# Patient Record
Sex: MALE | Race: WHITE | NOT HISPANIC OR LATINO | Employment: OTHER | URBAN - METROPOLITAN AREA
[De-identification: names, ages, dates, MRNs, and addresses within clinical notes are randomized per-mention and may not be internally consistent; named-entity substitution may affect disease eponyms.]

---

## 2019-04-29 RX ORDER — DICLOFENAC SODIUM AND MISOPROSTOL 75; 200 MG/1; UG/1
TABLET, DELAYED RELEASE ORAL
Qty: 60 TABLET | Refills: 5 | Status: SHIPPED | OUTPATIENT
Start: 2019-04-29

## 2020-10-21 ENCOUNTER — OFFICE VISIT (OUTPATIENT)
Dept: CARDIOTHORACIC SURGERY | Facility: CLINIC | Age: 80
End: 2020-10-21
Payer: MEDICARE

## 2020-10-21 VITALS
HEART RATE: 40 BPM | BODY MASS INDEX: 27.55 KG/M2 | DIASTOLIC BLOOD PRESSURE: 73 MMHG | HEIGHT: 69 IN | SYSTOLIC BLOOD PRESSURE: 132 MMHG | WEIGHT: 186 LBS

## 2020-10-21 DIAGNOSIS — D64.9 ANEMIA, UNSPECIFIED TYPE: ICD-10-CM

## 2020-10-21 DIAGNOSIS — I48.20 CHRONIC ATRIAL FIBRILLATION: ICD-10-CM

## 2020-10-21 DIAGNOSIS — I34.0 MITRAL VALVE INSUFFICIENCY, UNSPECIFIED ETIOLOGY: ICD-10-CM

## 2020-10-21 PROCEDURE — 99213 OFFICE O/P EST LOW 20 MIN: CPT | Mod: S$GLB,,, | Performed by: NURSE PRACTITIONER

## 2020-10-21 PROCEDURE — 99213 PR OFFICE/OUTPT VISIT, EST, LEVL III, 20-29 MIN: ICD-10-PCS | Mod: S$GLB,,, | Performed by: NURSE PRACTITIONER

## 2020-10-21 RX ORDER — CLORAZEPATE DIPOTASSIUM 7.5 MG/1
TABLET ORAL
COMMUNITY

## 2020-10-21 RX ORDER — BRIMONIDINE TARTRATE 1 MG/ML
SOLUTION/ DROPS OPHTHALMIC
COMMUNITY

## 2020-10-21 RX ORDER — LISINOPRIL 10 MG/1
TABLET ORAL
COMMUNITY

## 2020-10-21 RX ORDER — HYDROXYZINE HYDROCHLORIDE 25 MG/1
TABLET, FILM COATED ORAL
COMMUNITY

## 2020-10-21 RX ORDER — DORZOLAMIDE HYDROCHLORIDE AND TIMOLOL MALEATE 20; 5 MG/ML; MG/ML
SOLUTION/ DROPS OPHTHALMIC
COMMUNITY

## 2020-10-21 RX ORDER — TRIAMTERENE AND HYDROCHLOROTHIAZIDE 75; 50 MG/1; MG/1
TABLET ORAL
COMMUNITY

## 2020-10-21 RX ORDER — DABIGATRAN ETEXILATE 150 MG/1
CAPSULE ORAL
COMMUNITY
End: 2020-10-21 | Stop reason: ALTCHOICE

## 2020-10-21 RX ORDER — AMLODIPINE BESYLATE 10 MG/1
TABLET ORAL
COMMUNITY

## 2020-10-21 RX ORDER — ALLOPURINOL 100 MG/1
TABLET ORAL
COMMUNITY

## 2020-10-21 RX ORDER — SERTRALINE HYDROCHLORIDE 100 MG/1
TABLET, FILM COATED ORAL
COMMUNITY

## 2020-10-21 RX ORDER — BIMATOPROST 0.1 MG/ML
SOLUTION/ DROPS OPHTHALMIC
COMMUNITY

## 2020-10-21 NOTE — PROGRESS NOTES
Subjective:    Patient ID:  Jame Herrera is a 80 y.o. male who presents for evaluation of severe prolapse of the P1 segment of the mitral valve; his right ventricle does appear moderately dilated, there is moderate mitral regurgitation identified by DARREN; coronary angiogram revealed normal coronary arteries; patient referred to be evaluated by Dr Apple in the clinic for possible mitral valve replacement and appendage removal.        Review of Systems   Constitution: Negative for chills, decreased appetite, diaphoresis, fever, malaise/fatigue, night sweats, weight gain and weight loss.   Cardiovascular: Negative for chest pain, claudication, dyspnea on exertion, irregular heartbeat, leg swelling, near-syncope, orthopnea, palpitations, paroxysmal nocturnal dyspnea and syncope.   Respiratory: Negative for cough, shortness of breath and sleep disturbances due to breathing.    Musculoskeletal: Positive for joint pain.        To right hip; he states this has been present since his hip surgery.   Gastrointestinal: Negative for anorexia, constipation, diarrhea, nausea and vomiting.   Neurological: Negative for dizziness, light-headedness and weakness.        Objective:    Physical Exam   Constitutional: He is oriented to person, place, and time. He appears well-developed and well-nourished.   Cardiovascular: Normal rate and regular rhythm.   Murmur heard.  Holosystolic murmur   Pulmonary/Chest: Breath sounds normal.   Abdominal: Soft.   Musculoskeletal: Normal range of motion.   Neurological: He is alert and oriented to person, place, and time.   Skin: Skin is warm and dry.         Assessment:       1. Mitral valve insufficiency, unspecified etiology    2. Chronic atrial fibrillation    3. Anemia, unspecified type         Plan:       Jame was seen today for valvular heart disease.    Diagnoses and all orders for this visit:    Mitral valve insufficiency, unspecified etiology    Chronic atrial fibrillation    Anemia,  unspecified type    Patient does ambulate with a cane; states he has been in atrial fib for the past 10 years, but has been told for the past 2 years he has been in NSR; currently on eliquis; will speak to dr perez in regards to patient being placed on vitamin B,C, and epogen prior to surgery; also will need ct of chest and abdomen due to anemia and kidney cancer prior to surgery; if he has not had an event recorder in the past 6 months, he will need one for 7 days to establish his atrial fib burden. Patient to fu once all of the above has been obtained.

## 2020-10-21 NOTE — LETTER
October 21, 2020      Zechariah Cantu IV, MD  [address] [place] [address]           [facility]  [address] [place] [address]  Phone: [phone]  Fax: [phone]          Patient: Jame Herrera   MR Number: [id number]   YOB: 1940   Date of Visit: 10/21/2020       Dear Dr. Zechariah Cantu IV:    Thank you for referring Jame Herrera to me for evaluation. Attached you will find relevant portions of my assessment and plan of care.    If you have questions, please do not hesitate to call me. I look forward to following Jame Herrera along with you.    Sincerely,    Nancy Presley, NP    Enclosure  CC:  No Recipients    If you would like to receive this communication electronically, please contact [email or web address] or [phone] to request more information on [employer] access.    For providers and/or their staff who would like to refer a patient to [facility], please contact us through our one-stop-shop provider referral line, [facility], at [phone].    If you feel you have received this communication in error or would no longer like to receive these types of communications, please e-mail [email or web address]

## 2020-11-18 ENCOUNTER — OFFICE VISIT (OUTPATIENT)
Dept: CARDIOTHORACIC SURGERY | Facility: CLINIC | Age: 80
End: 2020-11-18
Payer: MEDICARE

## 2020-11-18 VITALS
BODY MASS INDEX: 28.58 KG/M2 | SYSTOLIC BLOOD PRESSURE: 170 MMHG | DIASTOLIC BLOOD PRESSURE: 80 MMHG | OXYGEN SATURATION: 96 % | WEIGHT: 193 LBS | HEIGHT: 69 IN | RESPIRATION RATE: 17 BRPM | HEART RATE: 69 BPM

## 2020-11-18 DIAGNOSIS — I34.0 MITRAL VALVE INSUFFICIENCY, UNSPECIFIED ETIOLOGY: Primary | ICD-10-CM

## 2020-11-18 PROCEDURE — 99213 PR OFFICE/OUTPT VISIT, EST, LEVL III, 20-29 MIN: ICD-10-PCS | Mod: S$GLB,,, | Performed by: NURSE PRACTITIONER

## 2020-11-18 PROCEDURE — 99213 OFFICE O/P EST LOW 20 MIN: CPT | Mod: S$GLB,,, | Performed by: NURSE PRACTITIONER

## 2020-11-18 RX ORDER — FUROSEMIDE 20 MG/1
20 TABLET ORAL 2 TIMES DAILY
COMMUNITY

## 2020-11-18 RX ORDER — ERYTHROMYCIN 5 MG/G
OINTMENT OPHTHALMIC
COMMUNITY
Start: 2020-11-10

## 2020-11-19 NOTE — PROGRESS NOTES
Subjective:    Patient ID:  Jame Herrera is a 80 y.o. male who presents for evaluation of review of preoperative work up that was needed prior to proceeding with mitral valve replacement, maze, and appendage removal; wife and patient are here to discuss recommendations.        Review of Systems   Constitution: Negative for chills, decreased appetite, diaphoresis, fever, malaise/fatigue, night sweats, weight gain and weight loss.   Cardiovascular: Positive for dyspnea on exertion and orthopnea. Negative for chest pain, claudication, irregular heartbeat, leg swelling, near-syncope, palpitations, paroxysmal nocturnal dyspnea and syncope.   Respiratory: Positive for shortness of breath. Negative for cough and sleep disturbances due to breathing.    Gastrointestinal: Negative for anorexia, constipation, diarrhea, nausea and vomiting.   Neurological: Negative for dizziness, light-headedness and weakness.        Objective:    Physical Exam   Constitutional: He is oriented to person, place, and time. He appears well-developed and well-nourished.   Eyes: Left eye exhibits discharge.   From left eye; he states he is being treated for an eye infection at this time   Cardiovascular: Normal rate and regular rhythm.   Murmur heard.  Positive for 3/6 systolic murmur   Pulmonary/Chest: Breath sounds normal.   Abdominal: Soft.   Musculoskeletal: Normal range of motion.   Neurological: He is alert and oriented to person, place, and time.   Skin: Skin is warm and dry.         Assessment:       1. Mitral valve insufficiency, unspecified etiology         Plan:       Jame was seen today for shortness of breath.    Diagnoses and all orders for this visit:    Mitral valve insufficiency, unspecified etiology    Preoperative testing was acceptable for patient to have an open mitral valve replacement; however, Dr Hoffman who follows him for pancytopenia and thrombocytopenia is concerned for the patient to have an open surgery; he would like  the patient to considered for a TMVR or mitral clip; I did notify Dr Cantu of this; Dr Cantu is going to speak to Dr Hoffman regarding his concern and possibly refer the patient to [facility] for a mitral valve clip.